# Patient Record
Sex: FEMALE | Race: OTHER
[De-identification: names, ages, dates, MRNs, and addresses within clinical notes are randomized per-mention and may not be internally consistent; named-entity substitution may affect disease eponyms.]

---

## 2022-08-25 ENCOUNTER — HOSPITAL ENCOUNTER (EMERGENCY)
Dept: HOSPITAL 54 - ER | Age: 42
Discharge: HOME | End: 2022-08-25
Payer: MEDICAID

## 2022-08-25 VITALS — SYSTOLIC BLOOD PRESSURE: 106 MMHG | DIASTOLIC BLOOD PRESSURE: 74 MMHG

## 2022-08-25 VITALS — HEIGHT: 62 IN | WEIGHT: 240 LBS | BODY MASS INDEX: 44.16 KG/M2

## 2022-08-25 DIAGNOSIS — J40: Primary | ICD-10-CM

## 2022-08-25 NOTE — NUR
BIBS C/O "fever/chills/cough/congestion xcouple days". AMBULATORY, [id number], 
BREATHING EVEN AND UNLABORED SATURATING AT 96%RA

## 2023-08-16 ENCOUNTER — HOSPITAL ENCOUNTER (EMERGENCY)
Dept: HOSPITAL 54 - ER | Age: 43
Discharge: HOME | End: 2023-08-16
Payer: MEDICAID

## 2023-08-16 VITALS — OXYGEN SATURATION: 99 % | DIASTOLIC BLOOD PRESSURE: 73 MMHG | SYSTOLIC BLOOD PRESSURE: 128 MMHG | TEMPERATURE: 98.4 F

## 2023-08-16 VITALS — WEIGHT: 220 LBS | HEIGHT: 66 IN | BODY MASS INDEX: 35.36 KG/M2

## 2023-08-16 DIAGNOSIS — N39.0: Primary | ICD-10-CM

## 2023-08-16 LAB
APPEARANCE UR: (no result)
BACTERIA UR CULT: YES
BILIRUB UR QL STRIP: NEGATIVE
COLOR UR: (no result)
GLUCOSE UR STRIP-MCNC: NEGATIVE MG/DL
HCG UR QL: NEGATIVE
HGB UR QL STRIP: (no result) ERY/UL
KETONES UR STRIP-MCNC: (no result) MG/DL
LEUKOCYTE ESTERASE UR QL STRIP: (no result)
NITRITE UR QL STRIP: POSITIVE
PH UR STRIP: 6.5 [PH] (ref 5–8)
PROT UR QL STRIP: (no result) MG/DL
RBC #/AREA URNS HPF: (no result) /HPF (ref 0–2)
SP GR UR STRIP: 1.02 (ref 1–1.03)
UROBILINOGEN UR STRIP-MCNC: 1 EU/DL